# Patient Record
Sex: FEMALE | Race: WHITE | Employment: FULL TIME | URBAN - METROPOLITAN AREA
[De-identification: names, ages, dates, MRNs, and addresses within clinical notes are randomized per-mention and may not be internally consistent; named-entity substitution may affect disease eponyms.]

---

## 2024-01-08 NOTE — PROGRESS NOTES
Radha Little    Age 28 y.o.    female    1995    MRN [id number]    1/11/2024  Arrival Time_____________  OR Time____________55 Min     Procedure(s):  SUCTION DILATATION AND CURETTAGE                      General   Surgeon(s):  Raqueltte, Melida, DO      DAY ADMIT ___  SDS/OP ___  OUTPT IN BED ___        Phone [phone] (home)     PCP _____________________ Phone_________________ Epic ( ) Epic CE ( ) Appt ________    ADDITIONAL INFO __________________________________ Cardio/Consult _____________    NOTES _____________________________________________________________________    ____________________________________________________________________________    PAT APPT DATE:________ TIME: ________  FAXED QAD: _______  (__) H&P w/ Hospitalist  __________________________________________________________________________  Preop Nurse phone screen complete: _____________  (__) CBC     (__) W/ DIFF ___________     (__) Hgb A1C    ___________  (__) CHEST X RAY   __________  (__) LIPID PROFILE  ___________  (__) EKG   __________  (__) PT-INR / APTT  ___________  (__) PFT's   __________  (__) BMP   ___________  (__) CAROTIDS  __________  (__) CMP   ___________  (__) VEIN MAPPING  __________  (__) U/A   ___________  (__) HISTORY & PHYSICAL __________  (__) URINE C & S  ___________  (__) CARDIAC CLEARANCE __________  (__) U/A W/ FLEX  ___________  (__) PULM. CLEARANCE __________  (__) SERUM PREGNANCY ___________  (__) Check Epic DOS orders __________  (__) TYPE & SCREEN __________repeat ( ) (__)  __________________ __________  (__) Albumin / Prealbumin ___________  (__)  __________________ __________  (__) TRANSFERRIN  ___________  (__)  __________________ __________  (__) LIVER PROFILE  ___________  (__)  __________________ __________  (__) MRSA NASAL SWAB ___________  (__) URINE PREG DOS __________  (__) SED RATE  ___________  (__) BLOOD SUGAR DOS __________  (__) C-REACTIVE PROTEIN ___________    (__) VITAMIN D

## 2024-01-08 NOTE — PROGRESS NOTES
Surgery Date and Time: 1/11/24 @ 10:30 am    Arrival Time:  08:30 am    The instructions given when and if a patient needs to stop oral intake prior to surgery varies. Follow the instructions you were given by your    Surgeon or RN during the Pre-op call.       __X__Nothing to eat or to drink after Midnight the night before the surgery. NO gum, mints, candy or ice chips day of surgery.                  Only take the following medications with a small sip of water the morning of surgery:  none                 Hold the following medications (per anesthesia or surgeon request):          Last Dose:      Aspirin, Ibuprofen, Advil, Naproxen, Vitamin E and other Anti-inflammatory products and supplements should be stopped for 5 -7days before surgery      or as directed by your physician.       - Do not smoke or vape, and do not drink any alcoholic beverages 24 hours prior to surgery, this includes NA Beer. Refrain from using any recreational drugs,     including non-prescribed prescription drugs.     -You may brush your teeth and gargle the morning of surgery.  DO NOT SWALLOW WATER.    -You MUST plan for a responsible adult to stay on site while you are here and take you home after your surgery. You will not be allowed to leave alone or drive               yourself home. It is requested someone stay with you the first 24 hrs. Your surgery will be cancelled if you do not have a ride home with a responsible adult.    -A parent/legal guardian must accompany a child scheduled for surgery and plan to stay at the hospital until the child is discharged.  Please do not bring other                children with you.    -Please wear simple, loose-fitting clothing to the hospital. Do not bring valuables (money, credit cards, checkbooks, etc.) Do not wear any makeup (including                no eye makeup) and no nail polish if applicable.             - DO NOT wear any jewelry or body piercings day of surgery.  All body piercing

## 2024-01-10 ENCOUNTER — ANESTHESIA EVENT (OUTPATIENT)
Dept: OPERATING ROOM | Age: 29
End: 2024-01-10
Payer: COMMERCIAL

## 2024-01-11 ENCOUNTER — ANESTHESIA (OUTPATIENT)
Dept: OPERATING ROOM | Age: 29
End: 2024-01-11
Payer: COMMERCIAL

## 2024-01-11 ENCOUNTER — HOSPITAL ENCOUNTER (OUTPATIENT)
Age: 29
Setting detail: OUTPATIENT SURGERY
Discharge: HOME HEALTH | End: 2024-01-11
Attending: OBSTETRICS & GYNECOLOGY | Admitting: OBSTETRICS & GYNECOLOGY
Payer: COMMERCIAL

## 2024-01-11 VITALS
DIASTOLIC BLOOD PRESSURE: 75 MMHG | BODY MASS INDEX: 33.18 KG/M2 | TEMPERATURE: 97.9 F | HEIGHT: 72 IN | WEIGHT: 245 LBS | OXYGEN SATURATION: 95 % | SYSTOLIC BLOOD PRESSURE: 112 MMHG | HEART RATE: 68 BPM | RESPIRATION RATE: 18 BRPM

## 2024-01-11 DIAGNOSIS — O03.9 MISCARRIAGE: ICD-10-CM

## 2024-01-11 PROBLEM — Z98.890 S/P D&C (STATUS POST DILATION AND CURETTAGE): Status: ACTIVE | Noted: 2024-01-11

## 2024-01-11 LAB
ABO + RH BLD: NORMAL
BLD GP AB SCN SERPL QL: NORMAL
BLOOD GROUP ANTIBODIES SERPL: NORMAL
DAT IGG CAPTURE: NORMAL
DEPRECATED RDW RBC AUTO: 13.4 % (ref 12.4–15.4)
HCT VFR BLD AUTO: 37.8 % (ref 36–48)
HGB BLD-MCNC: 12.6 G/DL (ref 12–16)
MCH RBC QN AUTO: 28.3 PG (ref 26–34)
MCHC RBC AUTO-ENTMCNC: 33.4 G/DL (ref 31–36)
MCV RBC AUTO: 84.7 FL (ref 80–100)
PLATELET # BLD AUTO: 189 K/UL (ref 135–450)
PMV BLD AUTO: 7.4 FL (ref 5–10.5)
RBC # BLD AUTO: 4.46 M/UL (ref 4–5.2)
WBC # BLD AUTO: 6 K/UL (ref 4–11)

## 2024-01-11 PROCEDURE — 7100000010 HC PHASE II RECOVERY - FIRST 15 MIN: Performed by: OBSTETRICS & GYNECOLOGY

## 2024-01-11 PROCEDURE — 6360000002 HC RX W HCPCS: Performed by: NURSE ANESTHETIST, CERTIFIED REGISTERED

## 2024-01-11 PROCEDURE — 86870 RBC ANTIBODY IDENTIFICATION: CPT

## 2024-01-11 PROCEDURE — 2500000003 HC RX 250 WO HCPCS: Performed by: NURSE ANESTHETIST, CERTIFIED REGISTERED

## 2024-01-11 PROCEDURE — 7100000000 HC PACU RECOVERY - FIRST 15 MIN: Performed by: OBSTETRICS & GYNECOLOGY

## 2024-01-11 PROCEDURE — 86880 COOMBS TEST DIRECT: CPT

## 2024-01-11 PROCEDURE — 2709999900 HC NON-CHARGEABLE SUPPLY: Performed by: OBSTETRICS & GYNECOLOGY

## 2024-01-11 PROCEDURE — 88305 TISSUE EXAM BY PATHOLOGIST: CPT

## 2024-01-11 PROCEDURE — 2580000003 HC RX 258: Performed by: ANESTHESIOLOGY

## 2024-01-11 PROCEDURE — 7100000011 HC PHASE II RECOVERY - ADDTL 15 MIN: Performed by: OBSTETRICS & GYNECOLOGY

## 2024-01-11 PROCEDURE — 7100000001 HC PACU RECOVERY - ADDTL 15 MIN: Performed by: OBSTETRICS & GYNECOLOGY

## 2024-01-11 PROCEDURE — 3700000001 HC ADD 15 MINUTES (ANESTHESIA): Performed by: OBSTETRICS & GYNECOLOGY

## 2024-01-11 PROCEDURE — 3600000002 HC SURGERY LEVEL 2 BASE: Performed by: OBSTETRICS & GYNECOLOGY

## 2024-01-11 PROCEDURE — 6370000000 HC RX 637 (ALT 250 FOR IP): Performed by: OBSTETRICS & GYNECOLOGY

## 2024-01-11 PROCEDURE — 3700000000 HC ANESTHESIA ATTENDED CARE: Performed by: OBSTETRICS & GYNECOLOGY

## 2024-01-11 PROCEDURE — 86901 BLOOD TYPING SEROLOGIC RH(D): CPT

## 2024-01-11 PROCEDURE — 3600000012 HC SURGERY LEVEL 2 ADDTL 15MIN: Performed by: OBSTETRICS & GYNECOLOGY

## 2024-01-11 PROCEDURE — 86850 RBC ANTIBODY SCREEN: CPT

## 2024-01-11 PROCEDURE — 85027 COMPLETE CBC AUTOMATED: CPT

## 2024-01-11 PROCEDURE — 6360000002 HC RX W HCPCS: Performed by: OBSTETRICS & GYNECOLOGY

## 2024-01-11 PROCEDURE — 86900 BLOOD TYPING SEROLOGIC ABO: CPT

## 2024-01-11 RX ORDER — MEPERIDINE HYDROCHLORIDE 50 MG/ML
12.5 INJECTION INTRAMUSCULAR; INTRAVENOUS; SUBCUTANEOUS EVERY 5 MIN PRN
Status: DISCONTINUED | OUTPATIENT
Start: 2024-01-11 | End: 2024-01-11 | Stop reason: HOSPADM

## 2024-01-11 RX ORDER — IPRATROPIUM BROMIDE AND ALBUTEROL SULFATE 2.5; .5 MG/3ML; MG/3ML
1 SOLUTION RESPIRATORY (INHALATION)
Status: DISCONTINUED | OUTPATIENT
Start: 2024-01-11 | End: 2024-01-11 | Stop reason: HOSPADM

## 2024-01-11 RX ORDER — OXYCODONE HYDROCHLORIDE 5 MG/1
5 TABLET ORAL
Status: DISCONTINUED | OUTPATIENT
Start: 2024-01-11 | End: 2024-01-11 | Stop reason: HOSPADM

## 2024-01-11 RX ORDER — IBUPROFEN 800 MG/1
800 TABLET ORAL EVERY 8 HOURS PRN
Qty: 30 TABLET | Refills: 0 | Status: SHIPPED | OUTPATIENT
Start: 2024-01-11

## 2024-01-11 RX ORDER — PROPOFOL 10 MG/ML
INJECTION, EMULSION INTRAVENOUS PRN
Status: DISCONTINUED | OUTPATIENT
Start: 2024-01-11 | End: 2024-01-11 | Stop reason: SDUPTHER

## 2024-01-11 RX ORDER — SODIUM CHLORIDE 9 MG/ML
INJECTION, SOLUTION INTRAVENOUS PRN
Status: DISCONTINUED | OUTPATIENT
Start: 2024-01-11 | End: 2024-01-11 | Stop reason: HOSPADM

## 2024-01-11 RX ORDER — SODIUM CHLORIDE, SODIUM LACTATE, POTASSIUM CHLORIDE, CALCIUM CHLORIDE 600; 310; 30; 20 MG/100ML; MG/100ML; MG/100ML; MG/100ML
INJECTION, SOLUTION INTRAVENOUS CONTINUOUS
Status: DISCONTINUED | OUTPATIENT
Start: 2024-01-11 | End: 2024-01-11 | Stop reason: HOSPADM

## 2024-01-11 RX ORDER — LIDOCAINE HYDROCHLORIDE 10 MG/ML
1 INJECTION, SOLUTION EPIDURAL; INFILTRATION; INTRACAUDAL; PERINEURAL
Status: DISCONTINUED | OUTPATIENT
Start: 2024-01-11 | End: 2024-01-11 | Stop reason: HOSPADM

## 2024-01-11 RX ORDER — KETOROLAC TROMETHAMINE 30 MG/ML
INJECTION, SOLUTION INTRAMUSCULAR; INTRAVENOUS PRN
Status: DISCONTINUED | OUTPATIENT
Start: 2024-01-11 | End: 2024-01-11 | Stop reason: SDUPTHER

## 2024-01-11 RX ORDER — DEXAMETHASONE SODIUM PHOSPHATE 4 MG/ML
INJECTION, SOLUTION INTRA-ARTICULAR; INTRALESIONAL; INTRAMUSCULAR; INTRAVENOUS; SOFT TISSUE PRN
Status: DISCONTINUED | OUTPATIENT
Start: 2024-01-11 | End: 2024-01-11 | Stop reason: SDUPTHER

## 2024-01-11 RX ORDER — SODIUM CHLORIDE 0.9 % (FLUSH) 0.9 %
5-40 SYRINGE (ML) INJECTION PRN
Status: DISCONTINUED | OUTPATIENT
Start: 2024-01-11 | End: 2024-01-11 | Stop reason: HOSPADM

## 2024-01-11 RX ORDER — CITRIC ACID/SODIUM CITRATE 334-500MG
30 SOLUTION, ORAL ORAL ONCE
Status: DISCONTINUED | OUTPATIENT
Start: 2024-01-11 | End: 2024-01-11 | Stop reason: HOSPADM

## 2024-01-11 RX ORDER — KETAMINE HCL IN NACL, ISO-OSM 100MG/10ML
SYRINGE (ML) INJECTION PRN
Status: DISCONTINUED | OUTPATIENT
Start: 2024-01-11 | End: 2024-01-11 | Stop reason: SDUPTHER

## 2024-01-11 RX ORDER — PROCHLORPERAZINE EDISYLATE 5 MG/ML
5 INJECTION INTRAMUSCULAR; INTRAVENOUS
Status: DISCONTINUED | OUTPATIENT
Start: 2024-01-11 | End: 2024-01-11 | Stop reason: HOSPADM

## 2024-01-11 RX ORDER — OXYCODONE HYDROCHLORIDE 5 MG/1
10 TABLET ORAL PRN
Status: DISCONTINUED | OUTPATIENT
Start: 2024-01-11 | End: 2024-01-11 | Stop reason: HOSPADM

## 2024-01-11 RX ORDER — METHYLERGONOVINE MALEATE 0.2 MG/ML
200 INJECTION INTRAVENOUS ONCE
Status: COMPLETED | OUTPATIENT
Start: 2024-01-11 | End: 2024-01-11

## 2024-01-11 RX ORDER — ONDANSETRON 2 MG/ML
INJECTION INTRAMUSCULAR; INTRAVENOUS PRN
Status: DISCONTINUED | OUTPATIENT
Start: 2024-01-11 | End: 2024-01-11 | Stop reason: SDUPTHER

## 2024-01-11 RX ORDER — SODIUM CHLORIDE 0.9 % (FLUSH) 0.9 %
5-40 SYRINGE (ML) INJECTION EVERY 12 HOURS SCHEDULED
Status: DISCONTINUED | OUTPATIENT
Start: 2024-01-11 | End: 2024-01-11 | Stop reason: HOSPADM

## 2024-01-11 RX ORDER — MIDAZOLAM HYDROCHLORIDE 1 MG/ML
2 INJECTION INTRAMUSCULAR; INTRAVENOUS
Status: DISCONTINUED | OUTPATIENT
Start: 2024-01-11 | End: 2024-01-11 | Stop reason: HOSPADM

## 2024-01-11 RX ORDER — LIDOCAINE HYDROCHLORIDE 20 MG/ML
INJECTION, SOLUTION INFILTRATION; PERINEURAL PRN
Status: DISCONTINUED | OUTPATIENT
Start: 2024-01-11 | End: 2024-01-11 | Stop reason: SDUPTHER

## 2024-01-11 RX ORDER — FENTANYL CITRATE 50 UG/ML
INJECTION, SOLUTION INTRAMUSCULAR; INTRAVENOUS PRN
Status: DISCONTINUED | OUTPATIENT
Start: 2024-01-11 | End: 2024-01-11 | Stop reason: SDUPTHER

## 2024-01-11 RX ORDER — ACETAMINOPHEN 500 MG
500 TABLET ORAL EVERY 6 HOURS PRN
Qty: 30 TABLET | Refills: 0 | Status: SHIPPED | OUTPATIENT
Start: 2024-01-11

## 2024-01-11 RX ORDER — ONDANSETRON 4 MG/1
4 TABLET, FILM COATED ORAL
Qty: 12 TABLET | Refills: 0 | Status: SHIPPED | OUTPATIENT
Start: 2024-01-11

## 2024-01-11 RX ORDER — ONDANSETRON 2 MG/ML
4 INJECTION INTRAMUSCULAR; INTRAVENOUS
Status: DISCONTINUED | OUTPATIENT
Start: 2024-01-11 | End: 2024-01-11 | Stop reason: HOSPADM

## 2024-01-11 RX ADMIN — METHYLERGONOVINE MALEATE 200 MCG: 0.2 INJECTION, SOLUTION INTRAMUSCULAR; INTRAVENOUS at 10:56

## 2024-01-11 RX ADMIN — Medication 10 MG: at 10:53

## 2024-01-11 RX ADMIN — Medication 10 MG: at 10:26

## 2024-01-11 RX ADMIN — ONDANSETRON 4 MG: 2 INJECTION INTRAMUSCULAR; INTRAVENOUS at 10:39

## 2024-01-11 RX ADMIN — DEXAMETHASONE SODIUM PHOSPHATE 10 MG: 4 INJECTION, SOLUTION INTRAMUSCULAR; INTRAVENOUS at 10:39

## 2024-01-11 RX ADMIN — PROPOFOL 300 MG: 10 INJECTION, EMULSION INTRAVENOUS at 10:33

## 2024-01-11 RX ADMIN — SODIUM CHLORIDE, SODIUM LACTATE, POTASSIUM CHLORIDE, AND CALCIUM CHLORIDE: .6; .31; .03; .02 INJECTION, SOLUTION INTRAVENOUS at 10:22

## 2024-01-11 RX ADMIN — SODIUM CHLORIDE, SODIUM LACTATE, POTASSIUM CHLORIDE, AND CALCIUM CHLORIDE: .6; .31; .03; .02 INJECTION, SOLUTION INTRAVENOUS at 11:06

## 2024-01-11 RX ADMIN — LIDOCAINE HYDROCHLORIDE 60 MG: 20 INJECTION, SOLUTION INFILTRATION; PERINEURAL at 10:32

## 2024-01-11 RX ADMIN — FENTANYL CITRATE 50 MCG: 50 INJECTION, SOLUTION INTRAMUSCULAR; INTRAVENOUS at 10:54

## 2024-01-11 RX ADMIN — KETOROLAC TROMETHAMINE 30 MG: 30 INJECTION, SOLUTION INTRAMUSCULAR; INTRAVENOUS at 10:58

## 2024-01-11 ASSESSMENT — PAIN - FUNCTIONAL ASSESSMENT: PAIN_FUNCTIONAL_ASSESSMENT: 0-10

## 2024-01-11 NOTE — PROGRESS NOTES
Pt up to the bathroom to void without difficulty. Discharge instructions given to pt and family. Verbalized understanding. PIV removed. Pt dressed and wheeled out and discharged to the care of their family in stable condition.

## 2024-01-11 NOTE — PROGRESS NOTES
Pt brought to PACU. Report obtained from OR RN and anesthesia. Pt placed on monitor and RA.  Oral airway in place, writer at bedside for monitoring

## 2024-01-11 NOTE — H&P
H&P interval update    No interval changes in last 30 days.   Paper H&P in chart  R/B/A reviewed with pt.   Informed consents signed.   Proceed to OR as scheduled.     Melida Allen,   OB/GYN

## 2024-01-11 NOTE — OP NOTE
Operative Note      Patient: Radha Givens  YOB: 1995  MRN: [id number]    Date of Procedure: 2024    Pre- Op Diagnosis:   Spontaneous     Post-Op Diagnosis: Same       Procedure(s):  SUCTION DILATATION AND CURETTAGE    Surgeon(s):  Melida Allen DO    Assistant:   Surgical Assistant: Nick Greene    Anesthesia: General    Estimated Blood Loss (mL): less than 100     Complications: None    Specimens:   ID Type Source Tests Collected by Time Destination   A : PRODUCTS OF CONCEPTION Tissue Products of Conception SURGICAL PATHOLOGY Melida Allen DO 2024 1052        Implants:  none      Drains: none    Findings:  Uterus 9cm by sound, consistent  on bimanual exam, mobile and the cervix was noted to be 1cm dilated. She received methergine 0.2mg x1 dose intraoperatively for increased bleeding that soon after improved. Overall  procedure went well no apparent complications.      Details of procedure: The patient was taken to the operating room where spinal anesthesia was administered without difficulty. The patient was prepped and draped in usual sterile fashion in lithotomy position. A weighted speculum was placed. The anterior lip of the cervix was grasped with a single tooth tenaculum. At this time, a 7-mm suction curettage was advanced into the uterine cavity without difficulty and was used to suction contents of the uterus. Following removal of the products of conception, a sharp curette was advanced into the uterine cavity and was used to scrape the four walls of the uterus until a gritty texture was noted. At this time, the suction curette was advanced one additional time to suction any remaining products. All instruments were removed. Hemostasis was visualized. The patient was stable at the completion of the procedure. Sponge, lap, and instrument counts were correct.       Electronically signed by Melida Allen DO on 2024 at 11:07 AM

## 2024-01-11 NOTE — PROGRESS NOTES
Patient admitted to [facility] 4 in preparation for surgery, VSS. Consent confirmed. IV inserted into RAC, LR infusing. Belongings on cart to PACU. NPO since 1730. Family at bedside, phone number in system for text updates, call light within reach.

## 2024-01-11 NOTE — ANESTHESIA POSTPROCEDURE EVALUATION
Department of Anesthesiology  Postprocedure Note    Patient: Radah Givens  MRN: [id number]  YOB: 1995  Date of evaluation: 1/11/2024    Procedure Summary       Date: 01/11/24 Room / Location: [address]    Anesthesia Start: 1026 Anesthesia Stop: 1114    Procedure: SUCTION DILATATION AND CURETTAGE (Vagina ) Diagnosis:       Miscarriage      (Miscarriage [O03.9])    Surgeons: Melida Allen DO Responsible Provider: Mark Sosa MD    Anesthesia Type: general ASA Status: 2            Anesthesia Type: No value filed.    Burke Phase I: Burke Score: 9    Burke Phase II: Burke Score: 10    Anesthesia Post Evaluation    Comments: Anes Post-op Note    Name:    Radha Givens  MRN:      [id number]    Patient Vitals in the past 12 hrs:  01/11/24 1144, Pulse:68, SpO2:95 %  01/11/24 1143, BP:112/75, Temp:97.9 °F (36.6 °C), Temp src:Axillary, Pulse:70, SpO2:100 %  01/11/24 1142, Pulse:68, SpO2:99 %  01/11/24 1141, Pulse:74, SpO2:96 %  01/11/24 1140, Pulse:69, SpO2:99 %  01/11/24 1139, Pulse:74, SpO2:98 %  01/11/24 1138, BP:108/73, Pulse:75, SpO2:96 %  01/11/24 1137, Pulse:76, SpO2:97 %  01/11/24 1136, Pulse:75, SpO2:97 %  01/11/24 1135, Pulse:88, SpO2:97 %  01/11/24 1134, Pulse:75, SpO2:96 %  01/11/24 1133, BP:113/72, Pulse:75, SpO2:100 %  01/11/24 1132, Pulse:75, SpO2:99 %  01/11/24 1131, Pulse:80, SpO2:98 %  01/11/24 1130, BP:108/88, Temp:97.9 °F (36.6 °C), Temp src:Axillary, Pulse:91, SpO2:97 %  01/11/24 1129, Pulse:83, SpO2:92 %  01/11/24 1128, Pulse:91, SpO2:97 %  01/11/24 1127, Pulse:78, SpO2:96 %  01/11/24 1126, Pulse:87, SpO2:100 %  01/11/24 1125, Pulse:83, SpO2:96 %  01/11/24 1124, Pulse:78, SpO2:97 %  01/11/24 1123, BP:112/74, Pulse:77, SpO2:95 %  01/11/24 1122, Pulse:84, SpO2:95 %  01/11/24 1121, Pulse:90, SpO2:96 %  01/11/24 1120, Pulse:51, SpO2:96 %  01/11/24 1119, Pulse:54, SpO2:96 %  01/11/24 1118, Pulse:54, SpO2:96 %  01/11/24 1117, BP:94/63, Pulse:54, SpO2:95

## 2024-01-11 NOTE — ANESTHESIA PRE PROCEDURE
Department of Anesthesiology  Preprocedure Note       Name:  Radha Givens   Age:  28 y.o.  :  1995                                          MRN:  [id number]         Date:  2024      Surgeon: Surgeon(s):  Melida Allen DO    Procedure: Procedure(s):  SUCTION DILATATION AND CURETTAGE    Medications prior to admission:   Prior to Admission medications    Medication Sig Start Date End Date Taking? Authorizing Provider   Prenatal Multivit-Min-Fe-FA (PRE-UMM PO) Take 1 each by mouth daily   Yes Provider, MD David       Current medications:    Current Facility-Administered Medications   Medication Dose Route Frequency Provider Last Rate Last Admin   • lidocaine PF 1 % injection 1 mL  1 mL IntraDERmal Once PRN Blayne Bergeron MD       • lactated ringers IV soln infusion   IntraVENous Continuous Blayne Bergeron MD       • sodium chloride flush 0.9 % injection 5-40 mL  5-40 mL IntraVENous 2 times per day Blayne Bergeron MD       • sodium chloride flush 0.9 % injection 5-40 mL  5-40 mL IntraVENous PRN Balyne Bergeron MD       • 0.9 % sodium chloride infusion   IntraVENous PRN Blayne Bergeron MD       • midazolam (VERSED) injection 2 mg  2 mg IntraVENous Once PRN Blayne Bergeron MD           Allergies:  No Known Allergies    Problem List:  There is no problem list on file for this patient.      Past Medical History:        Diagnosis Date   • AR (allergic rhinitis)    • Missed ab    • Neurocardiogenic syncope        Past Surgical History:        Procedure Laterality Date   • DENTAL SURGERY     • SALIVARY GLAND SURGERY         Social History:    Social History     Tobacco Use   • Smoking status: Never   • Smokeless tobacco: Never   Substance Use Topics   • Alcohol use: Not Currently                                Counseling given: Not Answered      Vital Signs (Current):   Vitals:    24 1457 24 1508   Weight: 112.5 kg (248 lb)

## 2024-09-26 LAB
ABO, EXTERNAL RESULT: NORMAL
HEP B, EXTERNAL RESULT: NEGATIVE
HIV, EXTERNAL RESULT: NEGATIVE
RH FACTOR, EXTERNAL RESULT: NEGATIVE
RPR, EXTERNAL RESULT: NONREACTIVE
RUBELLA TITER, EXTERNAL RESULT: NORMAL

## 2024-10-03 LAB
C. TRACHOMATIS, EXTERNAL RESULT: NEGATIVE
N. GONORRHOEAE, EXTERNAL RESULT: NEGATIVE

## 2025-03-12 LAB — GBS, EXTERNAL RESULT: NEGATIVE

## 2025-04-13 ENCOUNTER — HOSPITAL ENCOUNTER (INPATIENT)
Age: 30
LOS: 2 days | Discharge: HOME | End: 2025-04-15
Attending: OBSTETRICS & GYNECOLOGY | Admitting: OBSTETRICS & GYNECOLOGY
Payer: COMMERCIAL

## 2025-04-13 ENCOUNTER — APPOINTMENT (OUTPATIENT)
Dept: LABOR AND DELIVERY | Age: 30
End: 2025-04-13
Payer: COMMERCIAL

## 2025-04-13 PROBLEM — O48.0 POST TERM PREGNANCY: Status: ACTIVE | Noted: 2025-04-13

## 2025-04-13 LAB
ABO/RH: NORMAL
AMPHETAMINES UR QL SCN>1000 NG/ML: NORMAL
ANTIBODY SCREEN: NORMAL
BARBITURATES UR QL SCN>200 NG/ML: NORMAL
BASOPHILS # BLD: 0 K/UL (ref 0–0.2)
BASOPHILS NFR BLD: 0.4 %
BENZODIAZ UR QL SCN>200 NG/ML: NORMAL
BUPRENORPHINE+NOR UR QL SCN: NORMAL
CANNABINOIDS UR QL SCN>50 NG/ML: NORMAL
COCAINE UR QL SCN: NORMAL
DEPRECATED RDW RBC AUTO: 15.8 % (ref 12.4–15.4)
DRUG SCREEN COMMENT UR-IMP: NORMAL
EOSINOPHIL # BLD: 0.1 K/UL (ref 0–0.6)
EOSINOPHIL NFR BLD: 0.8 %
FENTANYL SCREEN, URINE: NORMAL
HCT VFR BLD AUTO: 34.3 % (ref 36–48)
HGB BLD-MCNC: 11.7 G/DL (ref 12–16)
LYMPHOCYTES # BLD: 1.3 K/UL (ref 1–5.1)
LYMPHOCYTES NFR BLD: 10.9 %
MCH RBC QN AUTO: 28.3 PG (ref 26–34)
MCHC RBC AUTO-ENTMCNC: 34.1 G/DL (ref 31–36)
MCV RBC AUTO: 83.2 FL (ref 80–100)
METHADONE UR QL SCN>300 NG/ML: NORMAL
MONOCYTES # BLD: 1 K/UL (ref 0–1.3)
MONOCYTES NFR BLD: 7.8 %
NEUTROPHILS # BLD: 9.8 K/UL (ref 1.7–7.7)
NEUTROPHILS NFR BLD: 80.1 %
OPIATES UR QL SCN>300 NG/ML: NORMAL
OXYCODONE UR QL SCN: NORMAL
PCP UR QL SCN>25 NG/ML: NORMAL
PH UR STRIP: 6 [PH]
PLATELET # BLD AUTO: 169 K/UL (ref 135–450)
PMV BLD AUTO: 7.9 FL (ref 5–10.5)
RBC # BLD AUTO: 4.13 M/UL (ref 4–5.2)
WBC # BLD AUTO: 12.2 K/UL (ref 4–11)

## 2025-04-13 PROCEDURE — 6370000000 HC RX 637 (ALT 250 FOR IP): Performed by: OBSTETRICS & GYNECOLOGY

## 2025-04-13 PROCEDURE — 86780 TREPONEMA PALLIDUM: CPT

## 2025-04-13 PROCEDURE — 3E0P7VZ INTRODUCTION OF HORMONE INTO FEMALE REPRODUCTIVE, VIA NATURAL OR ARTIFICIAL OPENING: ICD-10-PCS | Performed by: STUDENT IN AN ORGANIZED HEALTH CARE EDUCATION/TRAINING PROGRAM

## 2025-04-13 PROCEDURE — 86901 BLOOD TYPING SEROLOGIC RH(D): CPT

## 2025-04-13 PROCEDURE — 86850 RBC ANTIBODY SCREEN: CPT

## 2025-04-13 PROCEDURE — 80307 DRUG TEST PRSMV CHEM ANLYZR: CPT

## 2025-04-13 PROCEDURE — 1220000000 HC SEMI PRIVATE OB R&B

## 2025-04-13 PROCEDURE — 86900 BLOOD TYPING SEROLOGIC ABO: CPT

## 2025-04-13 PROCEDURE — 85025 COMPLETE CBC W/AUTO DIFF WBC: CPT

## 2025-04-13 RX ORDER — ONDANSETRON 2 MG/ML
4 INJECTION INTRAMUSCULAR; INTRAVENOUS EVERY 6 HOURS PRN
Status: CANCELLED | OUTPATIENT
Start: 2025-04-13

## 2025-04-13 RX ORDER — SODIUM CHLORIDE, SODIUM LACTATE, POTASSIUM CHLORIDE, AND CALCIUM CHLORIDE .6; .31; .03; .02 G/100ML; G/100ML; G/100ML; G/100ML
1000 INJECTION, SOLUTION INTRAVENOUS PRN
Status: DISCONTINUED | OUTPATIENT
Start: 2025-04-13 | End: 2025-04-14

## 2025-04-13 RX ORDER — ONDANSETRON 4 MG/1
4 TABLET, ORALLY DISINTEGRATING ORAL EVERY 8 HOURS PRN
Status: CANCELLED | OUTPATIENT
Start: 2025-04-13

## 2025-04-13 RX ORDER — TERBUTALINE SULFATE 1 MG/ML
0.25 INJECTION SUBCUTANEOUS
Status: CANCELLED | OUTPATIENT
Start: 2025-04-13

## 2025-04-13 RX ORDER — MISOPROSTOL 100 UG/1
400 TABLET ORAL PRN
Status: CANCELLED | OUTPATIENT
Start: 2025-04-13

## 2025-04-13 RX ORDER — SODIUM CHLORIDE, SODIUM LACTATE, POTASSIUM CHLORIDE, AND CALCIUM CHLORIDE .6; .31; .03; .02 G/100ML; G/100ML; G/100ML; G/100ML
1000 INJECTION, SOLUTION INTRAVENOUS PRN
Status: CANCELLED | OUTPATIENT
Start: 2025-04-13

## 2025-04-13 RX ORDER — CARBOPROST TROMETHAMINE 250 UG/ML
250 INJECTION, SOLUTION INTRAMUSCULAR PRN
Status: CANCELLED | OUTPATIENT
Start: 2025-04-13

## 2025-04-13 RX ORDER — SODIUM CHLORIDE, SODIUM LACTATE, POTASSIUM CHLORIDE, AND CALCIUM CHLORIDE .6; .31; .03; .02 G/100ML; G/100ML; G/100ML; G/100ML
500 INJECTION, SOLUTION INTRAVENOUS PRN
Status: CANCELLED | OUTPATIENT
Start: 2025-04-13

## 2025-04-13 RX ORDER — DOCUSATE SODIUM 100 MG/1
100 CAPSULE, LIQUID FILLED ORAL 2 TIMES DAILY
Status: CANCELLED | OUTPATIENT
Start: 2025-04-13

## 2025-04-13 RX ORDER — SODIUM CHLORIDE, SODIUM LACTATE, POTASSIUM CHLORIDE, CALCIUM CHLORIDE 600; 310; 30; 20 MG/100ML; MG/100ML; MG/100ML; MG/100ML
INJECTION, SOLUTION INTRAVENOUS CONTINUOUS
Status: DISCONTINUED | OUTPATIENT
Start: 2025-04-13 | End: 2025-04-14

## 2025-04-13 RX ORDER — ONDANSETRON 4 MG/1
4 TABLET, ORALLY DISINTEGRATING ORAL EVERY 8 HOURS PRN
Status: DISCONTINUED | OUTPATIENT
Start: 2025-04-13 | End: 2025-04-14

## 2025-04-13 RX ORDER — SODIUM CHLORIDE, SODIUM LACTATE, POTASSIUM CHLORIDE, CALCIUM CHLORIDE 600; 310; 30; 20 MG/100ML; MG/100ML; MG/100ML; MG/100ML
INJECTION, SOLUTION INTRAVENOUS CONTINUOUS
Status: CANCELLED | OUTPATIENT
Start: 2025-04-13

## 2025-04-13 RX ORDER — ONDANSETRON 2 MG/ML
4 INJECTION INTRAMUSCULAR; INTRAVENOUS EVERY 6 HOURS PRN
Status: DISCONTINUED | OUTPATIENT
Start: 2025-04-13 | End: 2025-04-14

## 2025-04-13 RX ORDER — ASPIRIN 81 MG/81MG
1 CAPSULE ORAL DAILY
Status: ON HOLD | COMMUNITY
Start: 2024-09-12 | End: 2025-04-15 | Stop reason: HOSPADM

## 2025-04-13 RX ORDER — SODIUM CHLORIDE, SODIUM LACTATE, POTASSIUM CHLORIDE, AND CALCIUM CHLORIDE .6; .31; .03; .02 G/100ML; G/100ML; G/100ML; G/100ML
500 INJECTION, SOLUTION INTRAVENOUS PRN
Status: DISCONTINUED | OUTPATIENT
Start: 2025-04-13 | End: 2025-04-14

## 2025-04-13 RX ORDER — FAMOTIDINE 10 MG/ML
20 INJECTION, SOLUTION INTRAVENOUS 2 TIMES DAILY
Status: CANCELLED | OUTPATIENT
Start: 2025-04-13

## 2025-04-13 RX ORDER — METHYLERGONOVINE MALEATE 0.2 MG/ML
200 INJECTION INTRAVENOUS PRN
Status: CANCELLED | OUTPATIENT
Start: 2025-04-13

## 2025-04-13 RX ADMIN — Medication 25 MCG: at 20:43

## 2025-04-14 ENCOUNTER — ANESTHESIA (OUTPATIENT)
Dept: LABOR AND DELIVERY | Age: 30
End: 2025-04-14
Payer: COMMERCIAL

## 2025-04-14 ENCOUNTER — ANESTHESIA EVENT (OUTPATIENT)
Dept: LABOR AND DELIVERY | Age: 30
End: 2025-04-14
Payer: COMMERCIAL

## 2025-04-14 PROBLEM — Z37.9 VACUUM-ASSISTED VAGINAL DELIVERY: Status: ACTIVE | Noted: 2025-04-14

## 2025-04-14 LAB — REAGIN+T PALLIDUM IGG+IGM SERPL-IMP: NORMAL

## 2025-04-14 PROCEDURE — 1220000000 HC SEMI PRIVATE OB R&B

## 2025-04-14 PROCEDURE — 10907ZC DRAINAGE OF AMNIOTIC FLUID, THERAPEUTIC FROM PRODUCTS OF CONCEPTION, VIA NATURAL OR ARTIFICIAL OPENING: ICD-10-PCS | Performed by: STUDENT IN AN ORGANIZED HEALTH CARE EDUCATION/TRAINING PROGRAM

## 2025-04-14 PROCEDURE — 7200000001 HC VAGINAL DELIVERY

## 2025-04-14 PROCEDURE — 2580000003 HC RX 258: Performed by: OBSTETRICS & GYNECOLOGY

## 2025-04-14 PROCEDURE — 2500000003 HC RX 250 WO HCPCS

## 2025-04-14 PROCEDURE — 0KQM0ZZ REPAIR PERINEUM MUSCLE, OPEN APPROACH: ICD-10-PCS | Performed by: STUDENT IN AN ORGANIZED HEALTH CARE EDUCATION/TRAINING PROGRAM

## 2025-04-14 PROCEDURE — 51701 INSERT BLADDER CATHETER: CPT

## 2025-04-14 PROCEDURE — 2580000003 HC RX 258: Performed by: STUDENT IN AN ORGANIZED HEALTH CARE EDUCATION/TRAINING PROGRAM

## 2025-04-14 PROCEDURE — 6360000002 HC RX W HCPCS: Performed by: OBSTETRICS & GYNECOLOGY

## 2025-04-14 PROCEDURE — 6370000000 HC RX 637 (ALT 250 FOR IP): Performed by: OBSTETRICS & GYNECOLOGY

## 2025-04-14 PROCEDURE — 2500000003 HC RX 250 WO HCPCS: Performed by: STUDENT IN AN ORGANIZED HEALTH CARE EDUCATION/TRAINING PROGRAM

## 2025-04-14 PROCEDURE — 6360000002 HC RX W HCPCS: Performed by: STUDENT IN AN ORGANIZED HEALTH CARE EDUCATION/TRAINING PROGRAM

## 2025-04-14 PROCEDURE — 3E033VJ INTRODUCTION OF OTHER HORMONE INTO PERIPHERAL VEIN, PERCUTANEOUS APPROACH: ICD-10-PCS | Performed by: STUDENT IN AN ORGANIZED HEALTH CARE EDUCATION/TRAINING PROGRAM

## 2025-04-14 PROCEDURE — 0DQR0ZZ REPAIR ANAL SPHINCTER, OPEN APPROACH: ICD-10-PCS | Performed by: STUDENT IN AN ORGANIZED HEALTH CARE EDUCATION/TRAINING PROGRAM

## 2025-04-14 PROCEDURE — 6370000000 HC RX 637 (ALT 250 FOR IP): Performed by: STUDENT IN AN ORGANIZED HEALTH CARE EDUCATION/TRAINING PROGRAM

## 2025-04-14 PROCEDURE — 6360000002 HC RX W HCPCS

## 2025-04-14 RX ORDER — TRANEXAMIC ACID 10 MG/ML
1000 INJECTION, SOLUTION INTRAVENOUS
Status: DISCONTINUED | OUTPATIENT
Start: 2025-04-14 | End: 2025-04-14

## 2025-04-14 RX ORDER — MISOPROSTOL 100 UG/1
400 TABLET ORAL PRN
Status: DISCONTINUED | OUTPATIENT
Start: 2025-04-14 | End: 2025-04-14

## 2025-04-14 RX ORDER — SODIUM CHLORIDE 0.9 % (FLUSH) 0.9 %
5-40 SYRINGE (ML) INJECTION EVERY 12 HOURS SCHEDULED
Status: DISCONTINUED | OUTPATIENT
Start: 2025-04-14 | End: 2025-04-15 | Stop reason: HOSPADM

## 2025-04-14 RX ORDER — BUPIVACAINE HYDROCHLORIDE 5 MG/ML
INJECTION, SOLUTION EPIDURAL; INTRACAUDAL; PERINEURAL
Status: DISCONTINUED | OUTPATIENT
Start: 2025-04-14 | End: 2025-04-14 | Stop reason: SDUPTHER

## 2025-04-14 RX ORDER — SODIUM CHLORIDE 0.9 % (FLUSH) 0.9 %
5-40 SYRINGE (ML) INJECTION PRN
Status: DISCONTINUED | OUTPATIENT
Start: 2025-04-14 | End: 2025-04-15 | Stop reason: HOSPADM

## 2025-04-14 RX ORDER — MISOPROSTOL 100 UG/1
800 TABLET ORAL PRN
Status: DISCONTINUED | OUTPATIENT
Start: 2025-04-14 | End: 2025-04-15 | Stop reason: HOSPADM

## 2025-04-14 RX ORDER — TRANEXAMIC ACID 10 MG/ML
1000 INJECTION, SOLUTION INTRAVENOUS
Status: DISCONTINUED | OUTPATIENT
Start: 2025-04-14 | End: 2025-04-15 | Stop reason: HOSPADM

## 2025-04-14 RX ORDER — DOCUSATE SODIUM 100 MG/1
100 CAPSULE, LIQUID FILLED ORAL 2 TIMES DAILY
Status: DISCONTINUED | OUTPATIENT
Start: 2025-04-14 | End: 2025-04-15 | Stop reason: HOSPADM

## 2025-04-14 RX ORDER — METHYLERGONOVINE MALEATE 0.2 MG/ML
200 INJECTION INTRAVENOUS PRN
Status: DISCONTINUED | OUTPATIENT
Start: 2025-04-14 | End: 2025-04-14

## 2025-04-14 RX ORDER — ONDANSETRON 4 MG/1
4 TABLET, ORALLY DISINTEGRATING ORAL EVERY 6 HOURS PRN
Status: DISCONTINUED | OUTPATIENT
Start: 2025-04-14 | End: 2025-04-15 | Stop reason: HOSPADM

## 2025-04-14 RX ORDER — SODIUM CHLORIDE, SODIUM LACTATE, POTASSIUM CHLORIDE, CALCIUM CHLORIDE 600; 310; 30; 20 MG/100ML; MG/100ML; MG/100ML; MG/100ML
INJECTION, SOLUTION INTRAVENOUS CONTINUOUS
Status: DISCONTINUED | OUTPATIENT
Start: 2025-04-14 | End: 2025-04-15 | Stop reason: HOSPADM

## 2025-04-14 RX ORDER — FERROUS SULFATE 325(65) MG
325 TABLET ORAL EVERY OTHER DAY
Status: DISCONTINUED | OUTPATIENT
Start: 2025-04-14 | End: 2025-04-15 | Stop reason: HOSPADM

## 2025-04-14 RX ORDER — DOCUSATE SODIUM 100 MG/1
100 CAPSULE, LIQUID FILLED ORAL 2 TIMES DAILY
Status: DISCONTINUED | OUTPATIENT
Start: 2025-04-14 | End: 2025-04-14

## 2025-04-14 RX ORDER — ACETAMINOPHEN 500 MG
1000 TABLET ORAL EVERY 8 HOURS
Status: DISCONTINUED | OUTPATIENT
Start: 2025-04-14 | End: 2025-04-15 | Stop reason: HOSPADM

## 2025-04-14 RX ORDER — FAMOTIDINE 20 MG/1
20 TABLET, FILM COATED ORAL 2 TIMES DAILY
Status: DISCONTINUED | OUTPATIENT
Start: 2025-04-14 | End: 2025-04-15 | Stop reason: HOSPADM

## 2025-04-14 RX ORDER — METHYLERGONOVINE MALEATE 0.2 MG/ML
200 INJECTION INTRAVENOUS PRN
Status: DISCONTINUED | OUTPATIENT
Start: 2025-04-14 | End: 2025-04-15 | Stop reason: HOSPADM

## 2025-04-14 RX ORDER — SODIUM CHLORIDE 9 MG/ML
INJECTION, SOLUTION INTRAVENOUS PRN
Status: DISCONTINUED | OUTPATIENT
Start: 2025-04-14 | End: 2025-04-15 | Stop reason: HOSPADM

## 2025-04-14 RX ORDER — CARBOPROST TROMETHAMINE 250 UG/ML
250 INJECTION, SOLUTION INTRAMUSCULAR PRN
Status: DISCONTINUED | OUTPATIENT
Start: 2025-04-14 | End: 2025-04-14

## 2025-04-14 RX ORDER — IBUPROFEN 800 MG/1
800 TABLET, FILM COATED ORAL EVERY 8 HOURS
Status: DISCONTINUED | OUTPATIENT
Start: 2025-04-14 | End: 2025-04-15 | Stop reason: HOSPADM

## 2025-04-14 RX ORDER — ONDANSETRON 2 MG/ML
4 INJECTION INTRAMUSCULAR; INTRAVENOUS EVERY 6 HOURS PRN
Status: DISCONTINUED | OUTPATIENT
Start: 2025-04-14 | End: 2025-04-15 | Stop reason: HOSPADM

## 2025-04-14 RX ORDER — SIMETHICONE 80 MG
80 TABLET,CHEWABLE ORAL EVERY 6 HOURS PRN
Status: DISCONTINUED | OUTPATIENT
Start: 2025-04-14 | End: 2025-04-15 | Stop reason: HOSPADM

## 2025-04-14 RX ORDER — NALBUPHINE HYDROCHLORIDE 10 MG/ML
10 INJECTION INTRAMUSCULAR; INTRAVENOUS; SUBCUTANEOUS
Status: DISCONTINUED | OUTPATIENT
Start: 2025-04-14 | End: 2025-04-14

## 2025-04-14 RX ORDER — BUPIVACAINE HYDROCHLORIDE 2.5 MG/ML
INJECTION, SOLUTION EPIDURAL; INFILTRATION; INTRACAUDAL; PERINEURAL
Status: DISCONTINUED | OUTPATIENT
Start: 2025-04-14 | End: 2025-04-14 | Stop reason: SDUPTHER

## 2025-04-14 RX ORDER — TERBUTALINE SULFATE 1 MG/ML
0.25 INJECTION SUBCUTANEOUS
Status: DISCONTINUED | OUTPATIENT
Start: 2025-04-14 | End: 2025-04-14

## 2025-04-14 RX ADMIN — BUPIVACAINE HYDROCHLORIDE 1.2 ML: 2.5 INJECTION, SOLUTION EPIDURAL; INFILTRATION; INTRACAUDAL; PERINEURAL at 07:11

## 2025-04-14 RX ADMIN — SODIUM CHLORIDE, SODIUM LACTATE, POTASSIUM CHLORIDE, AND CALCIUM CHLORIDE: .6; .31; .03; .02 INJECTION, SOLUTION INTRAVENOUS at 10:15

## 2025-04-14 RX ADMIN — SODIUM CHLORIDE, SODIUM LACTATE, POTASSIUM CHLORIDE, AND CALCIUM CHLORIDE: .6; .31; .03; .02 INJECTION, SOLUTION INTRAVENOUS at 07:24

## 2025-04-14 RX ADMIN — SODIUM CHLORIDE, SODIUM LACTATE, POTASSIUM CHLORIDE, AND CALCIUM CHLORIDE: .6; .31; .03; .02 INJECTION, SOLUTION INTRAVENOUS at 05:42

## 2025-04-14 RX ADMIN — IBUPROFEN 800 MG: 800 TABLET, FILM COATED ORAL at 20:18

## 2025-04-14 RX ADMIN — DOCUSATE SODIUM 100 MG: 100 CAPSULE, LIQUID FILLED ORAL at 20:18

## 2025-04-14 RX ADMIN — NALBUPHINE HYDROCHLORIDE 10 MG: 10 INJECTION, SOLUTION INTRAMUSCULAR; INTRAVENOUS; SUBCUTANEOUS at 06:10

## 2025-04-14 RX ADMIN — Medication 1 MILLI-UNITS/MIN: at 05:43

## 2025-04-14 RX ADMIN — Medication 25 MCG: at 00:48

## 2025-04-14 RX ADMIN — Medication 15 ML/HR: at 07:25

## 2025-04-14 RX ADMIN — BUPIVACAINE HYDROCHLORIDE 7 ML: 5 INJECTION, SOLUTION EPIDURAL; INTRACAUDAL; PERINEURAL at 07:47

## 2025-04-14 RX ADMIN — CEFAZOLIN 2000 MG: 2 INJECTION, POWDER, FOR SOLUTION INTRAVENOUS at 16:07

## 2025-04-14 RX ADMIN — Medication 10 ML: at 20:19

## 2025-04-14 ASSESSMENT — PAIN DESCRIPTION - DESCRIPTORS: DESCRIPTORS: DISCOMFORT

## 2025-04-14 ASSESSMENT — PAIN - FUNCTIONAL ASSESSMENT: PAIN_FUNCTIONAL_ASSESSMENT: ACTIVITIES ARE NOT PREVENTED

## 2025-04-14 ASSESSMENT — PAIN SCALES - GENERAL
PAINLEVEL_OUTOF10: 2
PAINLEVEL_OUTOF10: 8

## 2025-04-14 ASSESSMENT — PAIN DESCRIPTION - LOCATION: LOCATION: ABDOMEN

## 2025-04-14 NOTE — PROGRESS NOTES
Patient up ambulating in hallway telemetry EFM and toco in use. Loss of fix r/t increased ambulation.

## 2025-04-14 NOTE — PROGRESS NOTES
RN remained at bedside throughout pushing.  EFM continuously assessed.  Vaginal delivery of viable infant.  Dr Alonso at bedside

## 2025-04-14 NOTE — L&D DELIVERY NOTE
Department of Obstetrics and Gynecology  Vacuum Vaginal Delivery Note           Pre-operative Diagnosis:  Induction of labor post term      Post-operative Diagnosis:  Living  male infant. VAVD     Procedures:  Vacuum assisted vaginal delivery for maternal exhaustion/ pushing 3 hours  Repair of 3B perineal laceration     APGARS: 8/8     Anesthesia:  epidural anesthesia     Application and Delivery:                   Number of Pulls:  3  Number of pop offs: 0  Cup free of vaginal or cervical tissue:  yes              Infant presentation:  DILEEP              Max Pressure:  550 mmHg     Delivery Summary:  Labor & Delivery Summary  Dilation Complete Date: 2025  Second wendy Time: 1044                 Specimen:  Placenta disposed of     Condition:  infant stable to general nursery and mother stable     Complications: 3B laceration        Dictation: Patient is a 30yo  at 40.6wks and was admitted for eIOL. She received cytotec followed by pitocin, AROM, received an epidural and became complete.   She was placed in the dorsal lithotomy position. She pushed for 3hours and made it to +3 station. Maternal exhaustion noted. I discussed my recommendation to proceed with kiwi vacuum delivery with the patient. She agreed. The baby's head was confirmed to be in the DILEEP position with 100% effacement and +3 station. The vacuum was placed and the correct placement in front of the posterior fontanelle was confirmed digitally. With the patient's next contraction, the vacuum was inflated and a gentle upward pressure was used to assist with brining the baby's head to +5 station followed by the delivery of the head. The vacuum was deflated. The ant/posterior shoulders, and the remainder of the infant delivered without difficulty. Infant was placed on mother's abdomen. Neonatologist at bedside for delivery. Delayed cord clamping performed after 60 seconds. The cord was doubly clamped. The Father of the baby cut the cord.

## 2025-04-14 NOTE — ANESTHESIA PRE PROCEDURE
Department of Anesthesiology  Preprocedure Note       Name:  Radha Givens   Age:  29 y.o.  :  1995                                          MRN:  [id number]         Date:  2025      Surgeon: * No surgeons listed *    Procedure: * No procedures listed *    Medications prior to admission:   Prior to Admission medications    Medication Sig Start Date End Date Taking? Authorizing Provider   Aspirin (VAZALORE) 81 MG CAPS Take 1 capsule by mouth daily 24  Yes David Marquez MD   Cholecalciferol (VITAMIN D) 10 MCG (400 UNIT) CAPS Capsule Take 1 capsule by mouth daily   Yes ProviderDavid MD   Prenatal Multivit-Min-Fe-FA (PRE-UMM PO) Take 1 each by mouth daily   Yes ProviderDavid MD   ibuprofen (ADVIL;MOTRIN) 800 MG tablet Take 1 tablet by mouth every 8 hours as needed for Pain  Patient not taking: Reported on 2025   Melida Allen DO   acetaminophen (TYLENOL) 500 MG tablet Take 1 tablet by mouth every 6 hours as needed for Pain  Patient not taking: Reported on 2025   Melida Allen DO   ondansetron (ZOFRAN) 4 MG tablet Take 1 tablet by mouth every 4-6 hours as needed for Nausea or Vomiting  Patient not taking: Reported on 2025   Melida Allen DO       Current medications:    Current Facility-Administered Medications   Medication Dose Route Frequency Provider Last Rate Last Admin    oxytocin (PITOCIN) 30 units in 500 mL infusion  1-20 aftab-units/min IntraVENous Continuous Stacia Adler MD   Stopped at 25 0632    nalbuphine (NUBAIN) injection 10 mg  10 mg IntraVENous Q3H PRN Stacia Adler MD   10 mg at 25 0610    methylergonovine (METHERGINE) injection 200 mcg  200 mcg IntraMUSCular PRN Stacia Adler MD        carboprost (HEMABATE) injection 250 mcg  250 mcg IntraMUSCular PRN Stacia Adler MD        miSOPROStol (CYTOTEC) tablet 400 mcg  400 mcg Buccal PRN Stacia Adler MD        tranexamic acid-NaCl IVPB premix 1,000 mg

## 2025-04-14 NOTE — LACTATION NOTE
This note was copied from a baby's chart.  LACTATION CONSULTATION  Initial Lactation Consult:   Referred by: RN request    : No    Name: Robert Givens       MRN: [id number]         YOB: 2026   Time of Birth: 1:44 PM   Gestational age: Gestational Age: 40w6d   Birth Weight: Birth Weight: N/A Most Recent Weight:     Weight Change from Birth: Birth weight not on file           Maternal Assessment:  Maternal Data:  Information for the patient's mother:  Radha Givens [[id number]]   30 y.o.  /Para:   Information for the patient's mother:  Radha Givens [[id number]]     Information for the patient's mother:  Radha Givens [[id number]]   40w6d    Prenatal Breastfeeding Education: Self Educations     Prior Breastfeeding Experience: 1st time breastfeeding with this baby     Breastfeeding Goal: Exclusively Breastfeed     Breast Assessment  Right Breast: WDL  Right Nipple: Everts well   Right Areola: WDL   Right Nipple Comfort: comfortable   Right Nipple Integrity: Intact    Left Breast: WDL  Left Nipple: Everts well   Left Areola: WDL   Left Nipple Comfort: comfortable   Left Nipple Integrity: Intact    Medications of Concern:None    Maternal Toxicology:   Information for the patient's mother:  Radha Givens [[id number]]     Barbiturate Screen, Ur   Date Value Ref Range Status   2025 Neg Negative <200 ng/mL Final     Benzodiazepine Screen, Urine   Date Value Ref Range Status   2025 Neg Negative <200 ng/mL Final     Cannabinoid Scrn, Ur   Date Value Ref Range Status   2025 Neg Negative <50 ng/mL Final     Cocaine Metabolite Screen, Urine   Date Value Ref Range Status   2025 Neg Negative <300 ng/mL Final     Methadone Screen, Urine   Date Value Ref Range Status   2025 Neg Negative <300 ng/mL Final     pH, Urine   Date Value Ref Range Status   2025 6.0  Final     Comment:     Urine pH less than 5.0 or greater than 8.0 may indicate sample

## 2025-04-14 NOTE — PROGRESS NOTES
Patient admitted for elective induction of labor to room 383. Dr. Adler notified SVE 1/50/-3 cytote #1 placed at 2043.

## 2025-04-14 NOTE — H&P
[facility]  History and Physical      CHIEF COMPLAINT:  Post term pregnancy                                                                                                                                                                                                                                                                                                                                          HISTORY OF PRESENT ILLNESS:      Radha Givens is a 29 y.o. y.o.  at 40w5d by LMP confirmed by 12w2d ultrasound, Estimated Date of Delivery: 25 is admitted for induction of labor for post-term pregnancy.  + fetal movement, no leakage of fluid, no vaginal bleeding, contractions consistent with labor    PHYSICAL EXAM:  Vitals:    25 1934   BP: 128/77   Pulse: 97   Resp: 16   Temp: 98 °F (36.7 °C)   SpO2: 97%     Constitutional:  awake, alert, cooperative, no apparent distress  Fetal heart rate: CAT 1, 120 bpm  Estimated fetal weight: 3400gm  Cervix (RN check, not repeated): 50/-3, mid position, medium consistency, cephalic presentation.  Contraction frequency:  infrequent  Membranes:  intact    LABS:   Recent Labs     25   WBC 12.2*   HGB 11.7*   HCT 34.3*   MCV 83.2         OBSTETRIC HISTORY:  OB History    Para Term  AB Living   4 0 0 0 3 0   SAB IAB Ectopic Molar Multiple Live Births   3 0 0 0 0 0      # Outcome Date GA Lbr Kee/2nd Weight Sex Type Anes PTL Lv   4 Current            3 2024 9w0d          2 2023 4w0d          1 2023 6w0d               LABS:    Blood Type/Rh:  A negative    Antibody Screen:  Neg  Rubella:  immune  RPR:  non-reactive  Hepatitis B Surface Antigen: Neg  HIV:  Neg  Gonorrhea:  Neg  Chlamydia:  Neg  1 hour Glucose Tolerance Test:  128  Group B Strep:  negative    HOME MEDS:  Prior to Visit Medications    Medication Sig Taking? Authorizing Provider   Aspirin (VAZALORE) 81 MG

## 2025-04-14 NOTE — ANESTHESIA PROCEDURE NOTES
CSE Block    Patient location during procedure: OB  Start time: 4/14/2025 7:05 AM  End time: 4/14/2025 7:25 AM  Reason for block: labor epidural  Staffing  Performed: resident/CRNA   Resident/CRNA: Arminda Gordon APRN - SHAHEED  Other anesthesia staff: Eugenio Arnold, DREW  Performed by: Eugenio Arnold RN  Authorized by: Jaya Wall MD    CSE  Patient position: sitting  Prep: ChloraPrep and site prepped and draped  Patient monitoring: continuous pulse ox and frequent blood pressure checks  Approach: midline  Provider prep: mask and sterile gloves  Spinal Needle  Needle type: pencil-tip (Pencan)   Needle gauge: 25 G  Needle length: 4.75 in  Epidural Needle  Injection technique: RUFUS saline  Needle type: Tuohy   Needle gauge: 17 G  Needle length: 3.5 in  Needle insertion depth: 7 cm  Location: lumbar (1-5)  Catheter  Catheter type: side hole  Catheter size: 19 G  Catheter at skin depth: 12 cm  Test dose: negative (lidocaine 1.5% + Epi 1:200. Given incremintally at 0713)  Assessment  Hemodynamics: stable  Additional Notes  Patient in sitting position. FOB at bedside. Sterile prep and drape back. Skin infiltration with 6 mL of 1.5% Lidocaine. LORT with NS, CSE with 25g Pencan. Clear CSF noted. Intrathecal meds given as documented. Epidural catheter advanced easily. Test dose given after negative aspiration from catheter. Sterile dressing applied. Patient tolerated procedure well.   Preanesthetic Checklist  Completed: patient identified, IV checked, site marked, risks and benefits discussed, surgical/procedural consents, equipment checked, pre-op evaluation, timeout performed, anesthesia consent given, oxygen available, monitors applied/VS acknowledged, fire risk safety assessment completed and verbalized and blood product R/B/A discussed and consented

## 2025-04-15 VITALS
BODY MASS INDEX: 35.49 KG/M2 | HEART RATE: 93 BPM | HEIGHT: 72 IN | SYSTOLIC BLOOD PRESSURE: 104 MMHG | WEIGHT: 262 LBS | OXYGEN SATURATION: 99 % | RESPIRATION RATE: 16 BRPM | TEMPERATURE: 97.8 F | DIASTOLIC BLOOD PRESSURE: 65 MMHG

## 2025-04-15 LAB
DEPRECATED RDW RBC AUTO: 16.1 % (ref 12.4–15.4)
FETAL SCREEN: NORMAL
HCT VFR BLD AUTO: 27.8 % (ref 36–48)
HGB BLD-MCNC: 9.6 G/DL (ref 12–16)
MCH RBC QN AUTO: 28.7 PG (ref 26–34)
MCHC RBC AUTO-ENTMCNC: 34.4 G/DL (ref 31–36)
MCV RBC AUTO: 83.4 FL (ref 80–100)
PLATELET # BLD AUTO: 137 K/UL (ref 135–450)
PMV BLD AUTO: 8 FL (ref 5–10.5)
RBC # BLD AUTO: 3.34 M/UL (ref 4–5.2)
RHIG LOT NUMBER: NORMAL
WBC # BLD AUTO: 16 K/UL (ref 4–11)

## 2025-04-15 PROCEDURE — 6370000000 HC RX 637 (ALT 250 FOR IP): Performed by: STUDENT IN AN ORGANIZED HEALTH CARE EDUCATION/TRAINING PROGRAM

## 2025-04-15 PROCEDURE — 6360000002 HC RX W HCPCS: Performed by: STUDENT IN AN ORGANIZED HEALTH CARE EDUCATION/TRAINING PROGRAM

## 2025-04-15 PROCEDURE — 96372 THER/PROPH/DIAG INJ SC/IM: CPT

## 2025-04-15 PROCEDURE — 85027 COMPLETE CBC AUTOMATED: CPT

## 2025-04-15 PROCEDURE — 85461 HEMOGLOBIN FETAL: CPT

## 2025-04-15 PROCEDURE — 36415 COLL VENOUS BLD VENIPUNCTURE: CPT

## 2025-04-15 RX ORDER — PSEUDOEPHEDRINE HCL 30 MG
100 TABLET ORAL 2 TIMES DAILY
Qty: 60 CAPSULE | Refills: 0 | Status: SHIPPED | OUTPATIENT
Start: 2025-04-15

## 2025-04-15 RX ORDER — IBUPROFEN 800 MG/1
800 TABLET, FILM COATED ORAL EVERY 8 HOURS
Qty: 30 TABLET | Refills: 0 | Status: SHIPPED | OUTPATIENT
Start: 2025-04-15

## 2025-04-15 RX ADMIN — HUMAN RHO(D) IMMUNE GLOBULIN 300 MCG: 300 INJECTION, SOLUTION INTRAMUSCULAR at 16:51

## 2025-04-15 RX ADMIN — IBUPROFEN 800 MG: 800 TABLET, FILM COATED ORAL at 04:07

## 2025-04-15 RX ADMIN — IBUPROFEN 800 MG: 800 TABLET, FILM COATED ORAL at 12:03

## 2025-04-15 RX ADMIN — FERROUS SULFATE TAB 325 MG (65 MG ELEMENTAL FE) 325 MG: 325 (65 FE) TAB at 08:12

## 2025-04-15 RX ADMIN — DOCUSATE SODIUM 100 MG: 100 CAPSULE, LIQUID FILLED ORAL at 08:12

## 2025-04-15 RX ADMIN — ACETAMINOPHEN 1000 MG: 500 TABLET ORAL at 08:11

## 2025-04-15 RX ADMIN — ACETAMINOPHEN 1000 MG: 500 TABLET ORAL at 00:07

## 2025-04-15 RX ADMIN — ACETAMINOPHEN 1000 MG: 500 TABLET ORAL at 17:16

## 2025-04-15 ASSESSMENT — PAIN SCALES - GENERAL
PAINLEVEL_OUTOF10: 0
PAINLEVEL_OUTOF10: 2
PAINLEVEL_OUTOF10: 1

## 2025-04-15 ASSESSMENT — PAIN DESCRIPTION - LOCATION
LOCATION: BUTTOCKS;ABDOMEN
LOCATION: ABDOMEN
LOCATION: PERINEUM

## 2025-04-15 ASSESSMENT — PAIN DESCRIPTION - DESCRIPTORS
DESCRIPTORS: CRAMPING;DISCOMFORT
DESCRIPTORS: CRAMPING
DESCRIPTORS: ACHING

## 2025-04-15 ASSESSMENT — PAIN - FUNCTIONAL ASSESSMENT
PAIN_FUNCTIONAL_ASSESSMENT: ACTIVITIES ARE NOT PREVENTED
PAIN_FUNCTIONAL_ASSESSMENT: ACTIVITIES ARE NOT PREVENTED

## 2025-04-15 NOTE — PLAN OF CARE
Problem: Infection - Adult  Goal: Absence of infection at discharge  2025 by Tino Contreras, RN  Outcome: Progressing  2025 by Tino Contreras, RN  Outcome: Progressing  Goal: Absence of infection during hospitalization  Outcome: Progressing  Goal: Absence of fever/infection during anticipated neutropenic period  Outcome: Progressing     Problem: Safety - Adult  Goal: Free from fall injury  Outcome: Progressing     Problem: Discharge Planning  Goal: Discharge to home or other facility with appropriate resources  Outcome: Progressing     Problem: Chronic Conditions and Co-morbidities  Goal: Patient's chronic conditions and co-morbidity symptoms are monitored and maintained or improved  Outcome: Progressing     Problem: Postpartum  Goal: Experiences normal postpartum course  Description:  Postpartum OB-Pregnancy care plan goal which identifies if the mother is experiencing a normal postpartum course  Outcome: Progressing  Goal: Appropriate maternal -  bonding  Description:  Postpartum OB-Pregnancy care plan goal which identifies if the mother and  are bonding appropriately  Outcome: Progressing  Goal: Establishment of infant feeding pattern  Description:  Postpartum OB-Pregnancy care plan goal which identifies if the mother is establishing a feeding pattern with their   Outcome: Progressing  Goal: Incisions, wounds, or drain sites healing without S/S of infection  Outcome: Progressing

## 2025-04-15 NOTE — PROGRESS NOTES
Pt ambulates to bathroom with assist x 2.  Able to urinate without difficulty.  Pt given radhika care instructions.  Returns to bed without difficulty.

## 2025-04-15 NOTE — FLOWSHEET NOTE
Mom discharged home via wheelchair to pvt. Car.  Infant transferred to [facility] via transport team.Mom instructed to keep infant ID band on until rebanded at [facility].  Mom discharged in stable condition.

## 2025-04-15 NOTE — LACTATION NOTE
This note was copied from a baby's chart.  Lactation Progress Note      Data:    F/U consult for primip on day 1 pp with an infant born at 40.6 weeks gestation. MOB reports infant hasn't been doing as well at right breast, on & off. Does well on left side.       Urine output: established   Stool output: established  Percent weight change from birth:  -3%      Action:    Introduced self & ensured name & lactation # is on whiteboard in room. Suggested we wake infant before attempting to latch, MOB agreeable. Took infant to basinet, un-swaddled, undressed then took to mothers right breast.     Educated mother about football position, how to support infants head, how to support the breast, and steps for a JUVENAL. Added pillow support to bring infant next to the breast, not under the breast. Guided mothers hands for a JUVENAL, infant on & off the breast. Reviewed hand expression and suggested mother hand express drops of colostrum to entice. MOB able to hand express a few small drops. Mother noting to be hesitating when infant opens wide, only latching onto nipple.     Educated and demonstrated how to break suction & guided hands again for a JUVENAL, SRS noted. Broke suction and let mother practice. Another JUVENAL achieved & I observed 13 minutes of feed. Infant remained at the breast feeding well after consult ended.    Education:    Educated mother about what to expect over the next  24-48 hours with infant feedings, infant output & how to know infant is getting enough, reinforced the importance of a deep latch and how to achieve it, how to break suction and try again if latch is shallow, normal  behavior, how to wake a sleepy infant to feed, how the breasts work to make milk & protecting milk supply, what to expect with cluster feeding, how to hand express colostrum, and breast care.     Educated mother about infant feeding cues and encouraged mother to allow infant to breast feed on demand anytime feeding cues are shown and

## 2025-04-15 NOTE — FLOWSHEET NOTE
Discharge Phone Call    Patient Name: Radha Givens     OB Care Provider: Stacia Adler MD Discharge Date: 4/15/2025    Disposition of baby:    Phone Number: [phone] (home)     Attempts to Contact:  Date:    Caller  Date:    Caller  Date:    Caller    Information for the patient's :  Robert Givens [[id number]]   Delivery Method: Vaginal, Spontaneous    1.  Now that you are at home is your pain being well controlled?   Y/N   If no, instruct to call       provider.      2. Are you breastfeeding?    Y/N    Do you need any extra support from our lactation staff?      Y/N    If yes, provide number for lactation.  3. Have you made or already had your first appointment with the baby's doctor? Y/N   If no, do      you know when to schedule it?  Y/N    4. Have you scheduled your follow-up appointment?  Y/N  If no, do you know when to schedule       it?    Y/N   If no, they can find it on printed discharge instructions.  5. Did staff discuss safe sleep during your stay? Y/N   6. Did we explain things in a way you could understand?  Y/N  7. Were we respectful of your preferences for labor and birth and include you in the plan of       care?  Y/N  If no, please explain _______________________________________________  8. Is there anyone in particular you would like to mention who provided care for you? _______      _________________________________________________________________________     9. Were you given a Post-Birth Warning Signs handout?  Y/N  Do you have it somewhere      easily accessible?  Y/N  If no, please send them a copy and ask them to put it somewhere      easily found.  10. Have you been crying excessively, having anger or mood swings that feel out of control, or       feel like you can't cope with caring for yourself or baby? Y/N   If yes, they may be showing       signs of postpartum depression and should call provider. There is also a        depression test on page C5 in their

## 2025-04-15 NOTE — PLAN OF CARE
Problem: Infection - Adult  Goal: Absence of infection at discharge  4/15/2025 08 by Antonina Mireles RN  Outcome: Progressing  2025 by Tino Contreras RN  Outcome: Progressing  2025 by Tino Contreras RN  Outcome: Progressing  Goal: Absence of infection during hospitalization  4/15/2025 08 by Antonina Mireles RN  Outcome: Progressing  2025 by Tino Contreras RN  Outcome: Progressing  Goal: Absence of fever/infection during anticipated neutropenic period  4/15/2025 08 by Antonina Mireles RN  Outcome: Progressing  2025 by Tino Contreras RN  Outcome: Progressing     Problem: Safety - Adult  Goal: Free from fall injury  4/15/2025 0825 by Antonina Mireles RN  Outcome: Progressing  2025 by Tino Contreras RN  Outcome: Progressing     Problem: Discharge Planning  Goal: Discharge to home or other facility with appropriate resources  4/15/2025 0825 by Antonina Mireles RN  Outcome: Progressing  2025 by Tino Contreras RN  Outcome: Progressing     Problem: Chronic Conditions and Co-morbidities  Goal: Patient's chronic conditions and co-morbidity symptoms are monitored and maintained or improved  4/15/2025 0825 by Antonina Mireles RN  Outcome: Progressing  2025 by Tino Contreras RN  Outcome: Progressing     Problem: Postpartum  Goal: Experiences normal postpartum course  Description:  Postpartum OB-Pregnancy care plan goal which identifies if the mother is experiencing a normal postpartum course  4/15/2025 08 by Antonina Mireles RN  Outcome: Progressing  2025 by Tino Contreras RN  Outcome: Progressing  Goal: Appropriate maternal -  bonding  Description:  Postpartum OB-Pregnancy care plan goal which identifies if the mother and  are bonding appropriately  4/15/2025 0825 by Antonina Mireles RN  Outcome: Progressing  2025 by Tino Contreras RN  Outcome: Progressing  Goal:

## 2025-04-15 NOTE — DISCHARGE SUMMARY
Obstetrical Discharge Form    Primary OB Clinician: [facility]    Patient Active Problem List   Diagnosis    SAB (spontaneous )    S/P D&C (status post dilation and curettage)    Post term pregnancy    Vacuum-assisted vaginal delivery    Type 3b perineal laceration during delivery         Robert Givens [[id number]]      Delivery Providers    Delivering clinician: Bernice Alonso MD   Provider Role     Obstetrician    Jennifer Cr Primary Nurse    Rosa Calix RN Primary  Nurse     NICU Nurse     Neonatologist     Anesthesiologist     Nurse Anesthetist     Nurse Practitioner     Midwife    Nicole Millan RN Nursery Nurse     Respiratory Therapist     Scrub Tech     Assistant Surgeon                 Robert Givens [[id number]]      Rudolph Information       Changing the 's delivery date/time could affect patient care.:      Delivery date/time:  25 1344   Delivery type: Vaginal, Spontaneous    Details:                   Robert Givens [[id number]]      Apgars    Living status: Living  Apgars   1 Minute:  5 Minute:  10 Minute 15 Minute 20 Minute   Skin Color: 0  0       Heart Rate: 2  2       Reflex Irritability: 2  2       Muscle Tone: 2  2       Respiratory Effort: 2  2       Total: 8  8               Apgars Assigned By: Robert Damon RN [[id number]]      Anesthesia    Method: Epidural                 Intrapartum complications: None    Laceration: 3rd degree    Episiotomy: none    Rh Immune globulin given: yes    Rubella vaccine given: not applicable    Discharge Date: 4/15/25;    Condition:  stable        Plan:     Follow-up appointment with [facility] in 4 weeks.

## 2025-04-15 NOTE — PLAN OF CARE
Problem: Infection - Adult  Goal: Absence of infection at discharge  4/15/2025 1804 by Kaleigh Parada RN  Outcome: Adequate for Discharge  4/15/2025 0825 by Antonina Mireles RN  Outcome: Progressing  Goal: Absence of infection during hospitalization  4/15/2025 1804 by Kaleigh Parada RN  Outcome: Adequate for Discharge  4/15/2025 0825 by Antonina Mireles RN  Outcome: Progressing  Goal: Absence of fever/infection during anticipated neutropenic period  4/15/2025 1804 by Kaleigh Parada RN  Outcome: Adequate for Discharge  4/15/2025 0825 by Antonina Mireles RN  Outcome: Progressing

## 2025-04-15 NOTE — ANESTHESIA POSTPROCEDURE EVALUATION
Department of Anesthesiology  Postprocedure Note    Patient: Radha Givens  MRN: [id number]  YOB: 1995  Date of evaluation: 4/15/2025    Procedure Summary       Date: 04/14/25 Room / Location:     Anesthesia Start: 0655 Anesthesia Stop: 1344    Procedure: Labor Analgesia Diagnosis:     Scheduled Providers:  Responsible Provider: Jaya Wall MD    Anesthesia Type: CSE ASA Status: 2            Anesthesia Type: No value filed.    Burke Phase I:      Burke Phase II:      Anesthesia Post Evaluation    Level of consciousness: awake  Cardiovascular status: hemodynamically stable  Respiratory status: acceptable  Comments: No apparent complications from neuraxial anesthesia.  Pain management: adequate        No notable events documented.

## 2025-04-15 NOTE — DISCHARGE INSTRUCTIONS
Thank you for the opportunity to care for you and your family.    We hope that you are happy with the care we provided during your stay in the [facility]. We want to ensure that you have the help you need when you leave the hospital. If there is anything we can assist you with, please let us know.    Breastfeeding mothers may contact our lactation specialists with any problems or questions.  [facility] lactation services phone number is [phone].  Leave a message and your call will be returned.    Please refer to the information provided in the postpartum care booklet.  The following are warning signs to remember.    Call 911 if you have:    Chest pain or pressure  Shortness of breath, even at rest  Thoughts of harming yourself or others  Seizures    Call your healthcare provider if you have:    Temperature of 100.4 degrees or higher  Stitches that are not healing        -- Swelling, bleeding, drainage, foul odor, redness or warmth in/around your           stitches, staples, or incision (scar)        -- Bad smelling blood or discharge from the vagina  Vaginal bleeding that has increased         -- Soaking through one pad in an hour        -- You are passing clots larger than the size of a lemon  Red, warm tender area(s) in your breast or calf  Headache that does not get better, even after taking medicine; or headache with vision changes    Remember to notify all healthcare providers from your date of delivery to up to one year after giving birth!    CARING FOR YOURSELF        DIET/ACTIVITY    Eat a well balanced diet focusing on foods high in fiber and protein.  Drink plenty of fluids, especially water.  To avoid constipation you may take a mild stool softener as recommended by your doctor or midwife.  Gradually increase your activity. Resume an exercise regime only after being advised by your doctor or midwife.  When sitting or lying down, keep your legs elevated to reduce swelling.  Avoid

## 2025-04-15 NOTE — PROGRESS NOTES
Postpartum Vaginal Delivery Progress Note    Radha Givens is a 29 y.o. female  [id number]    Subjective:     Postpartum Day 1 S/p VAVD c/by 3rd degree vaginal laceration    The patient feels well this morning. Denies excessive pain, no heavy vaginal bleeding. Ambulating without difficulty. Baby rooming in. On breast feeds.     Objective:     Vitals:    04/14/25 2018 04/15/25 0004 04/15/25 0407 04/15/25 0809   BP: 109/68 (!) 107/57 (!) 117/55 119/65   Pulse: 97 86 81 83   Resp: 18 18 18 16   Temp: 98.1 °F (36.7 °C) 98.3 °F (36.8 °C) 98.2 °F (36.8 °C) 97.5 °F (36.4 °C)   TempSrc: Oral Oral Oral Oral   SpO2: 97%  96% 99%   Weight:       Height:           Phy Ex:   Breasts: no engorgement, masses   Chest clear  Heart normal rate and rhythm   Abd: soft non tender, uterus firm  Lochia: moderate  extr no e/c/c    Labs:   Lab Results   Component Value Date    WBC 16.0 (H) 04/15/2025    HGB 9.6 (L) 04/15/2025    HCT 27.8 (L) 04/15/2025    MCV 83.4 04/15/2025     04/15/2025     A neg, Rubella Immune    Assessment/Plan:  Status post Spontaneous vaginal delivery    1. Postpartum CBC appropriate  2. Meeting postpartum milestones  3. Baby doing well -  on breast feeds, parents desire circ - defer until tomorrow.   4. Rh neg - baby is Rh pos - for rhogam  5. Inpatient today    Zoe Boyer MD

## (undated) DEVICE — SET COLL TBNG L6FT DIA3/8IN W/ INTEGR SWVL HNDL SLIP RNG M

## (undated) DEVICE — GLOVE SURG SZ 65 THK91MIL LTX FREE SYN POLYISOPRENE

## (undated) DEVICE — D&C: Brand: MEDLINE INDUSTRIES, INC.